# Patient Record
Sex: MALE | Race: WHITE | NOT HISPANIC OR LATINO | Employment: FULL TIME | URBAN - METROPOLITAN AREA
[De-identification: names, ages, dates, MRNs, and addresses within clinical notes are randomized per-mention and may not be internally consistent; named-entity substitution may affect disease eponyms.]

---

## 2020-02-27 ENCOUNTER — OFFICE VISIT - HEALTHEAST (OUTPATIENT)
Dept: FAMILY MEDICINE | Facility: CLINIC | Age: 40
End: 2020-02-27

## 2020-02-27 ENCOUNTER — COMMUNICATION - HEALTHEAST (OUTPATIENT)
Dept: TELEHEALTH | Facility: CLINIC | Age: 40
End: 2020-02-27

## 2020-02-27 DIAGNOSIS — R03.0 ELEVATED BLOOD PRESSURE READING WITHOUT DIAGNOSIS OF HYPERTENSION: ICD-10-CM

## 2020-02-27 DIAGNOSIS — Z00.00 ROUTINE HEALTH MAINTENANCE: ICD-10-CM

## 2020-02-27 ASSESSMENT — PATIENT HEALTH QUESTIONNAIRE - PHQ9: SUM OF ALL RESPONSES TO PHQ QUESTIONS 1-9: 4

## 2020-02-27 ASSESSMENT — MIFFLIN-ST. JEOR: SCORE: 1825.25

## 2020-02-27 ASSESSMENT — ANXIETY QUESTIONNAIRES
GAD7 TOTAL SCORE: 6
6. BECOMING EASILY ANNOYED OR IRRITABLE: NOT AT ALL
4. TROUBLE RELAXING: SEVERAL DAYS
2. NOT BEING ABLE TO STOP OR CONTROL WORRYING: SEVERAL DAYS
3. WORRYING TOO MUCH ABOUT DIFFERENT THINGS: MORE THAN HALF THE DAYS
7. FEELING AFRAID AS IF SOMETHING AWFUL MIGHT HAPPEN: NOT AT ALL
IF YOU CHECKED OFF ANY PROBLEMS ON THIS QUESTIONNAIRE, HOW DIFFICULT HAVE THESE PROBLEMS MADE IT FOR YOU TO DO YOUR WORK, TAKE CARE OF THINGS AT HOME, OR GET ALONG WITH OTHER PEOPLE: NOT DIFFICULT AT ALL
5. BEING SO RESTLESS THAT IT IS HARD TO SIT STILL: NOT AT ALL
1. FEELING NERVOUS, ANXIOUS, OR ON EDGE: MORE THAN HALF THE DAYS

## 2021-05-26 ASSESSMENT — PATIENT HEALTH QUESTIONNAIRE - PHQ9: SUM OF ALL RESPONSES TO PHQ QUESTIONS 1-9: 4

## 2021-05-28 ASSESSMENT — ANXIETY QUESTIONNAIRES: GAD7 TOTAL SCORE: 6

## 2021-06-04 VITALS
HEART RATE: 70 BPM | HEIGHT: 69 IN | SYSTOLIC BLOOD PRESSURE: 124 MMHG | OXYGEN SATURATION: 99 % | BODY MASS INDEX: 30.36 KG/M2 | DIASTOLIC BLOOD PRESSURE: 84 MMHG | WEIGHT: 205 LBS

## 2021-06-06 NOTE — PROGRESS NOTES
Assessment/Plan:     Patient presents today for routine physical examination.    Healthcare Maintenance: USPSTF recommendations for age 40;  Patient has been counseled on/screened for:  - intimate partner violence and there are no concerns at this time  - a healthful diet and physical activity for CVD disease prevention  - Diabetes and hyperlipidemia: screening was performed at work within the last 2 months, results reviewed  Sexually transmitted infections: Patient would not like to be screened for chlamydia, gonorrhea, syphilis, HIV, and hepatitis  Immunizations: up to date except for td/tdap and influenza which was recommended    Additional concerns are as detailed below:    Patient has a history of elevated blood pressures, but no formal diagnosis of hypertension.  His blood pressure today is well controlled.  I recommended exercise and monitoring.  If patient has persistently elevated blood pressures he will return to clinic.    Patient's pinky has full range of motion.  There is no decrease strength.  The pain is mild.  I recommended magda taping with a low threshold for hand therapy.    AVS printed and given to patient.  Return to clinic in 1 year.     I have had an Advance Directives discussion with the patient.    This note has been dictated using voice recognition software. Any grammatical or context distortions are unintentional and inherent to the the software.     Stephenie Gallardo MD  Family Medicine [facility]    Subjective:     Chema Be is a 39 y.o. male who presents to clinic for routine physical.    Additional concerns include:    1. Patient had elevated blood pressure at a lab appointment at work. It was elevated both times. It was elevated when he went to the dentist, as well.   2.  Patient also injured his pinky finger by ringing his hands approximate 1 year ago.  He heard a popping sensation and occasionally now will swell.  It aches, sometimes there is a shooting pain that  "radiates up his arm.  No decrease in strength and he has full range of motion.    Diet: well balanced diet and frequent junk food  Physical Activity: The patient engages in little, if any physical activity.    PHQ-9 Score:  4    Health Care Directive: not on file but handout provided    Patient Care Tea  PCP: Stephenie Gallardo     Past Medical History, Family History, and Social History reviewed.     Review of systems is as stated in HPI.  Patient endorses: joint pain, dizziness  The remainder of the 10 system review is otherwise negative.    Objective:     /84   Pulse 70   Ht 5' 9\" (1.753 m)   Wt 205 lb (93 kg)   SpO2 99%   BMI 30.27 kg/m    Gen: Alert, NAD, appears stated age, normal hygiene   Eyes: conjunctivae without injection, sclera clear, EOMI  ENT/mouth: nares clear, septum midline, absent rhinorrhea,absent pharyngeal injection, neck is supple, no thyroid enlargement  CV: RRR, no murmur appreciated, pedal edema absent bilaterally  Resp: CTAB, no wheezes, rales or ronchi  ABD: normoactive, non-tender to palpation, nondistended  MSK: grossly full range of motion in all joints especially of the pinky of the right hand, no obvious deformity  Neuro: CN II-XII grossly intact, no deficits in coordination  Psych: no apparent hallucinations or delusions, no pressured speech; alert, oriented x3  SKIN: dry and without lesions; fleshy papule located on patient's back  Heme/lymph: no pallor, no active bleeding/bruising, no adenopathy appreciated    Medications:  No current outpatient medications on file.       Allergies:  No Known Allergies    PMH:  Past Medical History:   Diagnosis Date     Anxiety      Depression      Elevated blood pressure reading      HLD (hyperlipidemia)        PSH:  History reviewed. No pertinent surgical history.    Family Hx:  Family History   Problem Relation Age of Onset     Heart disease Paternal Uncle      Diabetes Maternal Grandfather      Alcohol abuse Maternal Grandfather      " Depression Maternal Grandfather        Social History:  Social History     Socioeconomic History     Marital status: Single     Spouse name: Not on file     Number of children: Not on file     Years of education: Not on file     Highest education level: Not on file   Occupational History     Not on file   Social Needs     Financial resource strain: Not on file     Food insecurity:     Worry: Not on file     Inability: Not on file     Transportation needs:     Medical: Not on file     Non-medical: Not on file   Tobacco Use     Smoking status: Never Smoker     Smokeless tobacco: Never Used   Substance and Sexual Activity     Alcohol use: Yes     Alcohol/week: 1.0 standard drinks     Types: 1 Cans of beer per week     Drug use: Never     Sexual activity: Not Currently     Partners: Female   Lifestyle     Physical activity:     Days per week: Not on file     Minutes per session: Not on file     Stress: Not on file   Relationships     Social connections:     Talks on phone: Not on file     Gets together: Not on file     Attends [employer] service: Not on file     Active member of club or organization: Not on file     Attends meetings of clubs or organizations: Not on file     Relationship status: Not on file     Intimate partner violence:     Fear of current or ex partner: Not on file     Emotionally abused: Not on file     Physically abused: Not on file     Forced sexual activity: Not on file   Other Topics Concern     Not on file   Social History Narrative     Not on file       No results found for: LDLCALC     Immunization History   Administered Date(s) Administered     Td, Adult, Absorbed 10/20/2009     There are no preventive care reminders to display for this patient.

## 2021-06-16 PROBLEM — R03.0 ELEVATED BLOOD PRESSURE READING WITHOUT DIAGNOSIS OF HYPERTENSION: Status: ACTIVE | Noted: 2020-02-27

## 2021-08-14 ENCOUNTER — HEALTH MAINTENANCE LETTER (OUTPATIENT)
Age: 41
End: 2021-08-14

## 2021-10-04 ENCOUNTER — HEALTH MAINTENANCE LETTER (OUTPATIENT)
Age: 41
End: 2021-10-04

## 2022-01-14 ENCOUNTER — IMMUNIZATION (OUTPATIENT)
Dept: NURSING | Facility: CLINIC | Age: 42
End: 2022-01-14
Payer: COMMERCIAL

## 2022-01-14 PROCEDURE — 0054A COVID-19,PF,PFIZER (12+ YRS): CPT

## 2022-01-14 PROCEDURE — 91305 COVID-19,PF,PFIZER (12+ YRS): CPT

## 2022-02-22 ENCOUNTER — HOSPITAL ENCOUNTER (EMERGENCY)
Facility: CLINIC | Age: 42
Discharge: HOME | End: 2022-02-22
Attending: EMERGENCY MEDICINE | Admitting: EMERGENCY MEDICINE
Payer: COMMERCIAL

## 2022-02-22 VITALS
HEART RATE: 52 BPM | SYSTOLIC BLOOD PRESSURE: 124 MMHG | WEIGHT: 215 LBS | RESPIRATION RATE: 16 BRPM | OXYGEN SATURATION: 98 % | BODY MASS INDEX: 30.78 KG/M2 | DIASTOLIC BLOOD PRESSURE: 92 MMHG | TEMPERATURE: 98.6 F | HEIGHT: 70 IN

## 2022-02-22 DIAGNOSIS — H81.10 BENIGN PAROXYSMAL POSITIONAL VERTIGO, UNSPECIFIED LATERALITY: ICD-10-CM

## 2022-02-22 PROCEDURE — 250N000013 HC RX MED GY IP 250 OP 250 PS 637: Performed by: EMERGENCY MEDICINE

## 2022-02-22 PROCEDURE — 99283 EMERGENCY DEPT VISIT LOW MDM: CPT

## 2022-02-22 RX ORDER — MECLIZINE HYDROCHLORIDE 25 MG/1
25 TABLET ORAL 3 TIMES DAILY PRN
Qty: 20 TABLET | Refills: 0 | Status: SHIPPED | OUTPATIENT
Start: 2022-02-22 | End: 2022-03-03

## 2022-02-22 RX ORDER — MECLIZINE HYDROCHLORIDE 25 MG/1
25 TABLET ORAL ONCE
Status: COMPLETED | OUTPATIENT
Start: 2022-02-22 | End: 2022-02-22

## 2022-02-22 RX ADMIN — MECLIZINE HYDROCHLORIDE 25 MG: 25 TABLET ORAL at 08:27

## 2022-02-22 ASSESSMENT — ENCOUNTER SYMPTOMS
NAUSEA: 0
DIZZINESS: 1
SHORTNESS OF BREATH: 0
HEADACHES: 0

## 2022-02-22 NOTE — DISCHARGE INSTRUCTIONS
Your symptoms appear consistent with benign paroxysmal positional vertigo.  Take the meclizine as needed for any repeat episodes of vertigo/dizziness.  Follow-up with the primary care physician for reevaluation as needed or return back to ED sooner for any worsening vertigo or any other new or concerning symptoms

## 2022-02-22 NOTE — ED TRIAGE NOTES
"Reports \"room spinning\" dizziness when layed down to bed at 2130 last pm, reports dizziness has continued when woke up this am and feels \"off balance\" with ambulation  Reports tingling in bilat hands and \"heaviness\" in hands, mild nausea  No unilateral weakness noted  Provider in triage to assess, no stroke code called        "

## 2022-02-22 NOTE — ED PROVIDER NOTES
EMERGENCY DEPARTMENT ENCOUNTER      NAME: Chema Be  AGE: 41 year old male  YOB: 1980  MRN: [id number]  EVALUATION DATE & TIME: No admission date for patient encounter.    PCP: Stephenie Gallardo    ED PROVIDER: Braulio Hough DO      Chief Complaint   Patient presents with     Dizziness       FINAL IMPRESSION:  1. Benign paroxysmal positional vertigo, unspecified laterality          ED COURSE & MEDICAL DECISION MAKIN-year-old male presented to the ED for evaluation of dizziness that started since he laid down last night to sleep.  Today the dizziness worsened after he woke up from sleep.  The patient stated that the dizziness was worsened with head position changes.  He denied any other significant symptoms.  Here in the ED the patient was slightly hypertensive but he was otherwise hemodynamically stable.  Initially evaluated the patient in triage because of concerns about possible stroke.  At the time of his evaluation the patient did not appear to be in any obvious distress or discomfort.  The patient did not have any nystagmus or any focal neurologic or cerebellar deficits.  The remainder of his physical exam was unremarkable as well.  Following his initial evaluation the patient was informed that his symptoms are likely due to benign paroxysmal positional vertigo.  Patient was given a dose of oral meclizine to treat his symptoms.      The patient was reevaluated approximately 1 hour after receiving the meclizine.  The patient stated that his symptoms had improved with the medication.  He was able to ambulate around the move and move his head without any reoccurrence of the vertigo symptoms.  The patient was educated about benign paroxysmal positional vertigo and reassured.  Patient felt comfortable returning home.  He was sent home with meclizine to take as needed.  He was informed that the meclizine may make him feel slightly drowsy.  The patient was instructed to follow-up with his  "care physician for reevaluation as needed or to return back to ED sooner for any worsening vertigo or any other new or concerning symptoms.     Pertinent Labs & Imaging studies reviewed. (See chart for details)  8:10 AM I met with the patient to gather history and to perform my initial exam. We discussed plans for the ED course, including diagnostic testing and treatment.       At the conclusion of the encounter I discussed the results of all of the tests and the disposition. The questions were answered. The patient or family acknowledged understanding and was agreeable with the care plan.       PPE worn: n95 mask, goggles    MEDICATIONS GIVEN IN THE EMERGENCY:  Medications   meclizine (ANTIVERT) tablet 25 mg (25 mg Oral Given 2/22/22 0827)       NEW PRESCRIPTIONS STARTED AT TODAY'S ER VISIT  New Prescriptions    MECLIZINE (ANTIVERT) 25 MG TABLET    Take 1 tablet (25 mg) by mouth 3 times daily as needed for dizziness     =================================================================    HPI    Patient information was obtained from: patient    Use of : N/A         Chema Be is a 41 year old male with no recorded pertinent medical history who presents to this ED via walk-in for evaluation of dizziness.     The patient reports sudden onset of room spinning dizziness last night while laying in bed around 2130 and decided to go to sleep. When the patient woke up this morning, the dizziness was still present and he stumbled getting up. Patient reports feeling \"wobbly\" while walking around this morning, prompting his visit to the ED. Here in the ED, patient states that the dizziness is coming and going. He notes that his hands feel \"heavy\" and were tingly while laying in bed last night. Patient denies headache, nausea, vision changes, chest pain, shortness of breath, hearing changes, and any other symptoms or complaints at this time.       REVIEW OF SYSTEMS   Review of Systems   HENT:        Negative for " hearing changes   Eyes: Negative for visual disturbance.   Respiratory: Negative for shortness of breath.    Cardiovascular: Negative for chest pain.   Gastrointestinal: Negative for nausea.   Neurological: Positive for dizziness. Negative for headaches.        Positive for tingling (bilateral hands)   All other systems reviewed and are negative.      PAST MEDICAL HISTORY:  Past Medical History:   Diagnosis Date     Anxiety      Anxiety      Depression      Depressive disorder      Elevated blood pressure reading      HLD (hyperlipidemia)        PAST SURGICAL HISTORY:  No past surgical history on file.      CURRENT MEDICATIONS:    meclizine (ANTIVERT) 25 MG tablet  HYDROcodone-acetaminophen (NORCO) 5-325 MG per tablet      ALLERGIES:  No Known Allergies    FAMILY HISTORY:  Family History   Problem Relation Age of Onset     Heart Disease Paternal Uncle      Diabetes Maternal Grandfather      Alcoholism Maternal Grandfather      Depression Maternal Grandfather        SOCIAL HISTORY:   Social History     Socioeconomic History     Marital status: Single     Spouse name: Not on file     Number of children: Not on file     Years of education: Not on file     Highest education level: Not on file   Occupational History     Not on file   Tobacco Use     Smoking status: Never Smoker     Smokeless tobacco: Never Used   Substance and Sexual Activity     Alcohol use: Yes     Alcohol/week: 1.0 standard drink     Drug use: Never     Sexual activity: Not Currently     Partners: Female   Other Topics Concern     Not on file   Social History Narrative     Not on file     Social Determinants of Health     Financial Resource Strain: Not on file   Food Insecurity: Not on file   Transportation Needs: Not on file   Physical Activity: Not on file   Stress: Not on file   Social Connections: Not on file   Intimate Partner Violence: Not on file   Housing Stability: Not on file       VITALS:  /84   Pulse 61   Temp 98.6  F (37  C)  "(Oral)   Resp 16   Ht 1.778 m (5' 10\")   Wt 97.5 kg (215 lb)   SpO2 97%   BMI 30.85 kg/m      PHYSICAL EXAM    General presentation: Alert, Vital signs reviewed. NAD  HENT: ENT inspection is normal. Oropharynx is moist and clear.   Eye: Pupils are equal and reactive to light. EOMI. No nystagmus.   Neck: The neck is supple, with full ROM, with no evidence of meningismus.  Pulmonary: Currently in no acute respiratory distress. Normal, non labored respirations, the lung sounds are normal with good equal air movement. Clear to auscultation bilaterally.   Circulatory: Regular rate and rhythm. Peripheral pulses are strong and equal. No murmurs, rubs, or gallops.   Abdominal: The abdomen is soft. Nontender. No rigidity, guarding, or rebound. Bowel sounds normal.   Neurologic: Alert, oriented to person, place, and time. No motor deficit. No sensory deficit. Cranial nerves II through XII are intact. Finger-nose-finger test normal bilaterally.   Musculoskeletal: No extremity tenderness. Full range of motion in all extremities. No extremity edema.   Skin: Skin color is normal. No rash. Warm. Dry to touch.      National Institutes of Health Stroke Scale  Exam Interval: Baseline   Score    Level of consciousness: (0)   Alert, keenly responsive    LOC questions: (0)   Answers both questions correctly    LOC commands: (0)   Performs both tasks correctly    Best gaze: (0)   Normal    Visual: (0)   No visual loss    Facial palsy: (0)   Normal symmetrical movements    Motor arm (left): (0)   No drift    Motor arm (right): (0)   No drift    Motor leg (left): (0)   No drift    Motor leg (right): (0)   No drift    Limb ataxia: (0)   Absent    Sensory: (0)   Normal- no sensory loss    Best language: (0)   Normal- no aphasia    Dysarthria: (0)   Normal    Extinction and inattention: (0)   No abnormality        Total Score:  0         Laney BURRIS , am serving as a scribe to document services personally performed by Braulio Hough DO " based on my observation and the provider's statements to me. I, Braulio Hough, attest that Laney Jarvis is acting in a scribe capacity, has observed my performance of the services and has documented them in accordance with my direction.    Braulio Hough DO  Emergency Medicine  [facility] EMERGENCY ROOM  [id number] [place] [address]  [phone]      Braulio Hough DO  02/22/22 1000

## 2022-03-03 ENCOUNTER — OFFICE VISIT (OUTPATIENT)
Dept: FAMILY MEDICINE | Facility: CLINIC | Age: 42
End: 2022-03-03
Payer: COMMERCIAL

## 2022-03-03 VITALS
OXYGEN SATURATION: 99 % | WEIGHT: 217 LBS | SYSTOLIC BLOOD PRESSURE: 122 MMHG | BODY MASS INDEX: 31.07 KG/M2 | DIASTOLIC BLOOD PRESSURE: 92 MMHG | HEART RATE: 76 BPM | HEIGHT: 70 IN

## 2022-03-03 DIAGNOSIS — Z00.00 HEALTHCARE MAINTENANCE: Primary | ICD-10-CM

## 2022-03-03 DIAGNOSIS — Z11.59 ENCOUNTER FOR HEPATITIS C VIRUS SCREENING TEST FOR HIGH RISK PATIENT: ICD-10-CM

## 2022-03-03 DIAGNOSIS — Z11.4 SCREENING FOR HIV WITHOUT PRESENCE OF RISK FACTORS: ICD-10-CM

## 2022-03-03 DIAGNOSIS — Z91.89 ENCOUNTER FOR HEPATITIS C VIRUS SCREENING TEST FOR HIGH RISK PATIENT: ICD-10-CM

## 2022-03-03 LAB
ALBUMIN SERPL-MCNC: 4.4 G/DL (ref 3.5–5)
ALP SERPL-CCNC: 75 U/L (ref 45–120)
ALT SERPL W P-5'-P-CCNC: 13 U/L (ref 0–45)
ANION GAP SERPL CALCULATED.3IONS-SCNC: 13 MMOL/L (ref 5–18)
AST SERPL W P-5'-P-CCNC: 13 U/L (ref 0–40)
BILIRUB SERPL-MCNC: 0.6 MG/DL (ref 0–1)
BUN SERPL-MCNC: 19 MG/DL (ref 8–22)
CALCIUM SERPL-MCNC: 9.8 MG/DL (ref 8.5–10.5)
CHLORIDE BLD-SCNC: 104 MMOL/L (ref 98–107)
CHOLEST SERPL-MCNC: 217 MG/DL
CO2 SERPL-SCNC: 24 MMOL/L (ref 22–31)
CREAT SERPL-MCNC: 1.09 MG/DL (ref 0.7–1.3)
FASTING STATUS PATIENT QL REPORTED: YES
GFR SERPL CREATININE-BSD FRML MDRD: 87 ML/MIN/1.73M2
GLUCOSE BLD-MCNC: 99 MG/DL (ref 70–125)
HDLC SERPL-MCNC: 41 MG/DL
HIV 1+2 AB+HIV1 P24 AG SERPL QL IA: NEGATIVE
LDLC SERPL CALC-MCNC: 139 MG/DL
POTASSIUM BLD-SCNC: 4.8 MMOL/L (ref 3.5–5)
PROT SERPL-MCNC: 7.5 G/DL (ref 6–8)
SODIUM SERPL-SCNC: 141 MMOL/L (ref 136–145)
TRIGL SERPL-MCNC: 187 MG/DL

## 2022-03-03 PROCEDURE — 36415 COLL VENOUS BLD VENIPUNCTURE: CPT | Performed by: FAMILY MEDICINE

## 2022-03-03 PROCEDURE — 80061 LIPID PANEL: CPT | Performed by: FAMILY MEDICINE

## 2022-03-03 PROCEDURE — 86803 HEPATITIS C AB TEST: CPT | Performed by: FAMILY MEDICINE

## 2022-03-03 PROCEDURE — 87389 HIV-1 AG W/HIV-1&-2 AB AG IA: CPT | Performed by: FAMILY MEDICINE

## 2022-03-03 PROCEDURE — 80053 COMPREHEN METABOLIC PANEL: CPT | Performed by: FAMILY MEDICINE

## 2022-03-03 PROCEDURE — 99396 PREV VISIT EST AGE 40-64: CPT | Performed by: FAMILY MEDICINE

## 2022-03-03 NOTE — PROGRESS NOTES
Assessment/Plan:     Patient presents today for routine physical examination.    Healthcare Maintenance: USPSTF recommendations for age 41;  Patient has been counseled on/screened for:  - intimate partner violence and there are no concerns at this time  - a healthful diet and physical activity for CVD prevention  - Diabetes and hyperlipidemia: screening was performed.   Sexually transmitted infections: Patient would not like to be screened for chlamydia, gonorrhea, syphilis, HIV, and hepatitis  Immunizations: up to date except for influenza which was recommended        Additional concerns are as detailed below:  Blood pressure is again mildly elevated.  Would recommend patient monitor blood pressure at home with a sphygmomanometer and report back blood pressure seem to be climbing.    1. Healthcare maintenance  - Comprehensive metabolic panel (BMP + Alb, Alk Phos, ALT, AST, Total. Bili, TP); Future  - Lipid panel reflex to direct LDL Fasting; Future    2. Encounter for hepatitis C virus screening test for high risk patient  - Hepatitis C antibody; Future    3. Screening for HIV without presence of risk factors  - HIV Antigen Antibody Combo; Future      AVS printed and given to patient.  Return to clinic in 1 year.     I have had an Advance Directives discussion with the patient.    This note has been dictated using voice recognition software. Any grammatical or context distortions are unintentional and inherent to the the software.     Stephenie Gallardo MD  Family Medicine [facility]    Subjective:     Chema Be is a 41 year old male who presents to clinic for routine physical.    Additional concerns include:    1. Vertigo brought pt to the ED last week.     PHQ-2 Score:  1    Health Care Directive: discussed    Patient Care Team:   PCP: Stephenie Gallardo     Past Medical History, Family History, and Social History reviewed.     Review of systems:  Patient endorses: depression/anxiety symptoms  The  "remainder of the 10 system review is otherwise negative.    Objective:     BP (!) 122/92   Pulse 76   Ht 1.765 m (5' 9.5\")   Wt 98.4 kg (217 lb)   SpO2 99%   BMI 31.59 kg/m    Gen: Alert, NAD, appears stated age, normal hygiene   Eyes: conjunctivae without injection, sclera clear, EOMI  ENT/mouth: nares clear, septum midline, absent rhinorrhea,absent pharyngeal injection, neck is supple, no thyroid enlargement  CV: RRR, no murmur appreciated, pedal edema absent bilaterally  Resp: CTAB, no wheezes, rales or ronchi  ABD: normoactive, non-tender to palpation, nondistended  MSK: grossly full range of motion in all joints, no obvious deformity  Neuro: CN II-XII grossly intact, no deficits in coordination  Psych: no apparent hallucinations or delusions, no pressured speech; alert, oriented x3  SKIN: dry and without lesions  Heme/lymph: no pallor, no active bleeding/bruising, no adenopathy appreciated    Medications:  No current outpatient medications on file.     No current facility-administered medications for this visit.       Allergies:  No Known Allergies    PMH:  Past Medical History:   Diagnosis Date     Anxiety      Anxiety      Depression      Depressive disorder      Elevated blood pressure reading      HLD (hyperlipidemia)        PSH:  History reviewed. No pertinent surgical history.    Family Hx:  Family History   Problem Relation Age of Onset     Heart Disease Paternal Uncle      Diabetes Maternal Grandfather      Alcoholism Maternal Grandfather      Depression Maternal Grandfather        Social History:  Social History     Socioeconomic History     Marital status: Single     Spouse name: Not on file     Number of children: Not on file     Years of education: Not on file     Highest education level: Not on file   Occupational History     Not on file   Tobacco Use     Smoking status: Never Smoker     Smokeless tobacco: Never Used   Substance and Sexual Activity     Alcohol use: Yes     Alcohol/week: 1.0 " standard drink     Drug use: Never     Sexual activity: Not Currently     Partners: Female   Other Topics Concern     Not on file   Social History Narrative     Not on file     Social Determinants of Health     Financial Resource Strain: Not on file   Food Insecurity: Not on file   Transportation Needs: Not on file   Physical Activity: Not on file   Stress: Not on file   Social Connections: Not on file   Intimate Partner Violence: Not on file   Housing Stability: Not on file       No components found for: LDLCALC     Immunization History   Administered Date(s) Administered     COVID-19,PF,Ray 04/07/2021     COVID-19,PF,Pfizer (12+ Yrs) 01/14/2022     Td (Adult), Adsorbed 10/20/2009     Tdap (Adacel,Boostrix) 02/27/2020         Answers for HPI/ROS submitted by the patient on 2/28/2022  Frequency of exercise:: None  Getting at least 3 servings of Calcium per day:: NO  Diet:: Regular (no restrictions)  Taking medications regularly:: Yes  Medication side effects:: Not applicable, None  Bi-annual eye exam:: NO  Dental care twice a year:: Yes  Sleep apnea or symptoms of sleep apnea:: None  Additional concerns today:: Yes

## 2022-03-04 LAB — HCV AB SERPL QL IA: NEGATIVE

## 2022-09-11 ENCOUNTER — HEALTH MAINTENANCE LETTER (OUTPATIENT)
Age: 42
End: 2022-09-11

## 2023-03-03 ASSESSMENT — ENCOUNTER SYMPTOMS
PALPITATIONS: 0
COUGH: 0
DIARRHEA: 0
DIZZINESS: 0
MYALGIAS: 0
PARESTHESIAS: 0
EYE PAIN: 0
DYSURIA: 0
NAUSEA: 0
SORE THROAT: 1
FREQUENCY: 0
ABDOMINAL PAIN: 0
HEMATOCHEZIA: 0
WEAKNESS: 0
ARTHRALGIAS: 0
HEADACHES: 0
FEVER: 0
NERVOUS/ANXIOUS: 1
HEARTBURN: 0
CHILLS: 0
SHORTNESS OF BREATH: 0
JOINT SWELLING: 0
HEMATURIA: 0
CONSTIPATION: 0

## 2023-03-10 ENCOUNTER — OFFICE VISIT (OUTPATIENT)
Dept: FAMILY MEDICINE | Facility: CLINIC | Age: 43
End: 2023-03-10
Payer: COMMERCIAL

## 2023-03-10 VITALS
HEART RATE: 77 BPM | OXYGEN SATURATION: 99 % | SYSTOLIC BLOOD PRESSURE: 126 MMHG | HEIGHT: 69 IN | WEIGHT: 221.5 LBS | TEMPERATURE: 98.3 F | BODY MASS INDEX: 32.81 KG/M2 | DIASTOLIC BLOOD PRESSURE: 84 MMHG | RESPIRATION RATE: 14 BRPM

## 2023-03-10 DIAGNOSIS — E66.09 CLASS 1 OBESITY DUE TO EXCESS CALORIES WITHOUT SERIOUS COMORBIDITY WITH BODY MASS INDEX (BMI) OF 32.0 TO 32.9 IN ADULT: ICD-10-CM

## 2023-03-10 DIAGNOSIS — R03.0 ELEVATED BLOOD PRESSURE READING WITHOUT DIAGNOSIS OF HYPERTENSION: ICD-10-CM

## 2023-03-10 DIAGNOSIS — H02.60 XANTHELASMA: ICD-10-CM

## 2023-03-10 DIAGNOSIS — E66.811 CLASS 1 OBESITY DUE TO EXCESS CALORIES WITHOUT SERIOUS COMORBIDITY WITH BODY MASS INDEX (BMI) OF 32.0 TO 32.9 IN ADULT: ICD-10-CM

## 2023-03-10 DIAGNOSIS — Z00.00 ROUTINE PHYSICAL EXAMINATION: Primary | ICD-10-CM

## 2023-03-10 DIAGNOSIS — E78.5 HYPERLIPIDEMIA LDL GOAL <100: ICD-10-CM

## 2023-03-10 LAB
ANION GAP SERPL CALCULATED.3IONS-SCNC: 13 MMOL/L (ref 7–15)
BUN SERPL-MCNC: 17.1 MG/DL (ref 6–20)
CALCIUM SERPL-MCNC: 9.9 MG/DL (ref 8.6–10)
CHLORIDE SERPL-SCNC: 100 MMOL/L (ref 98–107)
CHOLEST SERPL-MCNC: 247 MG/DL
CREAT SERPL-MCNC: 1.19 MG/DL (ref 0.67–1.17)
DEPRECATED HCO3 PLAS-SCNC: 25 MMOL/L (ref 22–29)
ERYTHROCYTE [DISTWIDTH] IN BLOOD BY AUTOMATED COUNT: 12.2 % (ref 10–15)
GFR SERPL CREATININE-BSD FRML MDRD: 78 ML/MIN/1.73M2
GLUCOSE SERPL-MCNC: 91 MG/DL (ref 70–99)
HCT VFR BLD AUTO: 44.8 % (ref 40–53)
HDLC SERPL-MCNC: 44 MG/DL
HGB BLD-MCNC: 15.5 G/DL (ref 13.3–17.7)
LDLC SERPL CALC-MCNC: 168 MG/DL
MCH RBC QN AUTO: 29.8 PG (ref 26.5–33)
MCHC RBC AUTO-ENTMCNC: 34.6 G/DL (ref 31.5–36.5)
MCV RBC AUTO: 86 FL (ref 78–100)
NONHDLC SERPL-MCNC: 203 MG/DL
PLATELET # BLD AUTO: 209 10E3/UL (ref 150–450)
POTASSIUM SERPL-SCNC: 4.1 MMOL/L (ref 3.4–5.3)
RBC # BLD AUTO: 5.21 10E6/UL (ref 4.4–5.9)
SODIUM SERPL-SCNC: 138 MMOL/L (ref 136–145)
TRIGL SERPL-MCNC: 173 MG/DL
TSH SERPL DL<=0.005 MIU/L-ACNC: 2.59 UIU/ML (ref 0.3–4.2)
WBC # BLD AUTO: 7.1 10E3/UL (ref 4–11)

## 2023-03-10 PROCEDURE — 85027 COMPLETE CBC AUTOMATED: CPT | Performed by: FAMILY MEDICINE

## 2023-03-10 PROCEDURE — 80061 LIPID PANEL: CPT | Performed by: FAMILY MEDICINE

## 2023-03-10 PROCEDURE — 99396 PREV VISIT EST AGE 40-64: CPT | Performed by: FAMILY MEDICINE

## 2023-03-10 PROCEDURE — 84443 ASSAY THYROID STIM HORMONE: CPT | Performed by: FAMILY MEDICINE

## 2023-03-10 PROCEDURE — 80048 BASIC METABOLIC PNL TOTAL CA: CPT | Performed by: FAMILY MEDICINE

## 2023-03-10 PROCEDURE — 36415 COLL VENOUS BLD VENIPUNCTURE: CPT | Performed by: FAMILY MEDICINE

## 2023-03-10 ASSESSMENT — PAIN SCALES - GENERAL: PAINLEVEL: SEVERE PAIN (7)

## 2023-04-13 ENCOUNTER — OFFICE VISIT (OUTPATIENT)
Dept: FAMILY MEDICINE | Facility: CLINIC | Age: 43
End: 2023-04-13
Payer: COMMERCIAL

## 2023-04-13 ENCOUNTER — MYC MEDICAL ADVICE (OUTPATIENT)
Dept: FAMILY MEDICINE | Facility: CLINIC | Age: 43
End: 2023-04-13

## 2023-04-13 ENCOUNTER — ANCILLARY PROCEDURE (OUTPATIENT)
Dept: GENERAL RADIOLOGY | Facility: CLINIC | Age: 43
End: 2023-04-13
Attending: FAMILY MEDICINE
Payer: COMMERCIAL

## 2023-04-13 VITALS
HEIGHT: 70 IN | BODY MASS INDEX: 32.1 KG/M2 | HEART RATE: 70 BPM | SYSTOLIC BLOOD PRESSURE: 128 MMHG | OXYGEN SATURATION: 97 % | TEMPERATURE: 97.6 F | DIASTOLIC BLOOD PRESSURE: 84 MMHG | RESPIRATION RATE: 15 BRPM | WEIGHT: 224.2 LBS

## 2023-04-13 DIAGNOSIS — J20.9 ACUTE BRONCHITIS, UNSPECIFIED ORGANISM: Primary | ICD-10-CM

## 2023-04-13 DIAGNOSIS — R05.1 ACUTE COUGH: ICD-10-CM

## 2023-04-13 DIAGNOSIS — J18.9 COMMUNITY ACQUIRED PNEUMONIA OF LEFT LOWER LOBE OF LUNG: ICD-10-CM

## 2023-04-13 DIAGNOSIS — J18.9 COMMUNITY ACQUIRED PNEUMONIA OF LEFT LOWER LOBE OF LUNG: Primary | ICD-10-CM

## 2023-04-13 PROCEDURE — 99214 OFFICE O/P EST MOD 30 MIN: CPT | Performed by: FAMILY MEDICINE

## 2023-04-13 PROCEDURE — 71046 X-RAY EXAM CHEST 2 VIEWS: CPT | Mod: TC | Performed by: RADIOLOGY

## 2023-04-13 RX ORDER — ALBUTEROL SULFATE 90 UG/1
2 AEROSOL, METERED RESPIRATORY (INHALATION) EVERY 6 HOURS PRN
Qty: 18 G | Refills: 0 | Status: SHIPPED | OUTPATIENT
Start: 2023-04-13 | End: 2024-02-14

## 2023-04-13 RX ORDER — AZITHROMYCIN 250 MG/1
TABLET, FILM COATED ORAL
Qty: 6 TABLET | Refills: 0 | Status: SHIPPED | OUTPATIENT
Start: 2023-04-13 | End: 2023-04-18

## 2023-04-13 RX ORDER — BENZONATATE 100 MG/1
100 CAPSULE ORAL 3 TIMES DAILY PRN
Qty: 30 CAPSULE | Refills: 0 | Status: SHIPPED | OUTPATIENT
Start: 2023-04-13 | End: 2024-02-14

## 2023-04-13 RX ORDER — PREDNISONE 20 MG/1
40 TABLET ORAL DAILY
Qty: 10 TABLET | Refills: 0 | Status: SHIPPED | OUTPATIENT
Start: 2023-04-13 | End: 2023-04-18

## 2023-04-13 ASSESSMENT — PAIN SCALES - GENERAL: PAINLEVEL: NO PAIN (0)

## 2023-04-13 NOTE — TELEPHONE ENCOUNTER
Writer replied to patient via MyChart and sent clinical references to patient as well.    VERONICA Bowman, RN  [employer] [facility]

## 2023-04-13 NOTE — TELEPHONE ENCOUNTER
Dr Terrell,  Please see MyChart message from patient needing provider direction regarding his MyC question?     Thank you,  MIGDALIA VenturaN, RN  [facility]

## 2023-04-13 NOTE — PROGRESS NOTES
"  Assessment & Plan     Community acquired pneumonia    - azithromycin (ZITHROMAX) 250 MG tablet  Dispense: 6 tablet; Refill: 0  - predniSONE (DELTASONE) 20 MG tablet  Dispense: 10 tablet; Refill: 0    Acute cough    - XR Chest 2 Views  - predniSONE (DELTASONE) 20 MG tablet  Dispense: 10 tablet; Refill: 0  - benzonatate (TESSALON) 100 MG capsule  Dispense: 30 capsule; Refill: 0  - albuterol (PROAIR HFA/PROVENTIL HFA/VENTOLIN HFA) 108 (90 Base) MCG/ACT inhaler  Dispense: 18 g; Refill: 0    Chest x-ray performed and personally reviewed.  Radiologist noted presence of airspace consolidation suggestive of pneumonia and left lower lobe.  Will treat with azithromycin. Counseled patient on use of medication.  We will also treat with short course of prednisone.  Provided prescription for benzonatate capsules and albuterol inhaler to use as well.  Patient counseled on these medications.  Encouraged rest, increase fluids.  Follow-up if symptoms worsen or do not improve or if new concerns develop               BMI:   Estimated body mass index is 31.95 kg/m  as calculated from the following:    Height as of this encounter: 1.784 m (5' 10.24\").    Weight as of this encounter: 101.7 kg (224 lb 3.2 oz).           Nida Terrell MD  [facility]    Sahil Mar is a 42 year old, presenting for the following health issues:  Cough (Cough for 3 weeks)        4/13/2023     9:09 AM   Additional Questions   Roomed by Marion Trivedi   Accompanied by Self     HPI   He comes in today for evaluation of the cough.  Cough has been present for the past 3 to 4 weeks.  Initially had some body aches the first couple of days he was sick.  Cough has mostly been dry.  Over the last week it has been occasionally productive.  Feels that there is phlegm and congestion in his chest.  Hears crackling in his chest, especially at night.  Has not noticed any wheezing.  Has not had shortness of breath but does feel a pressure in his " "chest.  He is otherwise feeling well.  Has not had fevers.  Has not had sore throat, sinus pain or pressure.  No postnasal drainage.  He does not have acid reflux.  He is not a smoker.  No history of asthma.  He has tried over-the-counter Mucinex which has not been helpful.  Does get a slight headache when he coughs a lot and ibuprofen is helpful for that symptom.  Review of systems is otherwise negative.  He takes no regular medications and has no known allergies.  No other concerns today          Review of Systems         Objective    /84   Pulse 70   Temp 97.6  F (36.4  C) (Temporal)   Resp 15   Ht 1.784 m (5' 10.24\")   Wt 101.7 kg (224 lb 3.2 oz)   SpO2 97%   BMI 31.95 kg/m    Body mass index is 31.95 kg/m .  Physical Exam   GENERAL: healthy, alert and no distress  EYES: Eyes grossly normal to inspection, PERRL and conjunctivae and sclerae normal  HENT: ear canals and TM's normal, nose and mouth without ulcers or lesions  RESP: coughs frequently with taking deep breaths, crackles and wheezing heard in left lower posterior lung field  CV: regular rate and rhythm, normal S1 S2, no S3 or S4, no murmur, click or rub, no peripheral edema and peripheral pulses strong  MS: no gross musculoskeletal defects noted, no edema  PSYCH: mentation appears normal, affect normal/bright    CXR - Reviewed and interpreted by me                "

## 2023-04-19 RX ORDER — AMOXICILLIN 500 MG/1
1000 CAPSULE ORAL 3 TIMES DAILY
Qty: 60 CAPSULE | Refills: 0 | Status: SHIPPED | OUTPATIENT
Start: 2023-04-19 | End: 2023-04-29

## 2023-04-19 RX ORDER — PREDNISONE 20 MG/1
20 TABLET ORAL DAILY
Qty: 5 TABLET | Refills: 0 | Status: SHIPPED | OUTPATIENT
Start: 2023-04-19 | End: 2023-04-24

## 2023-04-19 NOTE — TELEPHONE ENCOUNTER
Dr Terrell,  Please see MyChart message from patient needing provider direction.  Please respond directly to patient if appropriate.    MIGDALIA VenturaN, RN  [facility]

## 2023-04-20 ENCOUNTER — TRANSFERRED RECORDS (OUTPATIENT)
Dept: HEALTH INFORMATION MANAGEMENT | Facility: CLINIC | Age: 43
End: 2023-04-20
Payer: COMMERCIAL

## 2023-04-27 ENCOUNTER — MYC MEDICAL ADVICE (OUTPATIENT)
Dept: FAMILY MEDICINE | Facility: CLINIC | Age: 43
End: 2023-04-27

## 2023-04-27 ENCOUNTER — NURSE TRIAGE (OUTPATIENT)
Dept: FAMILY MEDICINE | Facility: CLINIC | Age: 43
End: 2023-04-27

## 2023-04-27 ENCOUNTER — OFFICE VISIT (OUTPATIENT)
Dept: FAMILY MEDICINE | Facility: CLINIC | Age: 43
End: 2023-04-27
Payer: COMMERCIAL

## 2023-04-27 ENCOUNTER — ANCILLARY PROCEDURE (OUTPATIENT)
Dept: GENERAL RADIOLOGY | Facility: CLINIC | Age: 43
End: 2023-04-27
Attending: FAMILY MEDICINE
Payer: COMMERCIAL

## 2023-04-27 VITALS
BODY MASS INDEX: 33.06 KG/M2 | HEIGHT: 69 IN | WEIGHT: 223.2 LBS | DIASTOLIC BLOOD PRESSURE: 84 MMHG | TEMPERATURE: 98.5 F | SYSTOLIC BLOOD PRESSURE: 124 MMHG | RESPIRATION RATE: 12 BRPM | OXYGEN SATURATION: 99 % | HEART RATE: 63 BPM

## 2023-04-27 DIAGNOSIS — J18.9 COMMUNITY ACQUIRED PNEUMONIA OF LEFT LOWER LOBE OF LUNG: ICD-10-CM

## 2023-04-27 DIAGNOSIS — J18.9 COMMUNITY ACQUIRED PNEUMONIA OF LEFT LOWER LOBE OF LUNG: Primary | ICD-10-CM

## 2023-04-27 DIAGNOSIS — R07.9 CHEST PAIN, UNSPECIFIED TYPE: ICD-10-CM

## 2023-04-27 LAB
ATRIAL RATE - MUSE: 63 BPM
DIASTOLIC BLOOD PRESSURE - MUSE: NORMAL MMHG
INTERPRETATION ECG - MUSE: NORMAL
P AXIS - MUSE: 35 DEGREES
PR INTERVAL - MUSE: 154 MS
QRS DURATION - MUSE: 90 MS
QT - MUSE: 390 MS
QTC - MUSE: 399 MS
R AXIS - MUSE: 16 DEGREES
SYSTOLIC BLOOD PRESSURE - MUSE: NORMAL MMHG
T AXIS - MUSE: 37 DEGREES
VENTRICULAR RATE- MUSE: 63 BPM

## 2023-04-27 PROCEDURE — 71046 X-RAY EXAM CHEST 2 VIEWS: CPT | Mod: TC | Performed by: RADIOLOGY

## 2023-04-27 PROCEDURE — 93005 ELECTROCARDIOGRAM TRACING: CPT | Performed by: FAMILY MEDICINE

## 2023-04-27 PROCEDURE — 99213 OFFICE O/P EST LOW 20 MIN: CPT | Performed by: FAMILY MEDICINE

## 2023-04-27 PROCEDURE — 93010 ELECTROCARDIOGRAM REPORT: CPT | Performed by: INTERNAL MEDICINE

## 2023-04-27 RX ORDER — CLOBETASOL PROPIONATE 0.5 MG/G
CREAM TOPICAL
COMMUNITY
Start: 2023-04-21 | End: 2024-02-14

## 2023-04-27 ASSESSMENT — PAIN SCALES - GENERAL: PAINLEVEL: SEVERE PAIN (6)

## 2023-04-27 NOTE — TELEPHONE ENCOUNTER
Care Advice:  Home Care.    An in-person office visit scheduled for this morning.  Patient would like more testing if applicable to ensure pneumonia not worsening - still coughing up greenish phlegm despite 2 more days of tx remaninig.   Patient reported to have taking OTC cough syrum/dropps.   Client denied fever, dizziness, coughing up blood    VERONICA De La Paz, RN  [facility]      Reason for Disposition    Chest pain(s) lasting a few seconds from coughing    Additional Information    Negative: SEVERE difficulty breathing (e.g., struggling for each breath, speaks in single words)    Negative: Passed out (i.e., fainted, collapsed and was not responding)    Negative: Difficult to awaken or acting confused (e.g., disoriented, slurred speech)    Negative: Shock suspected (e.g., cold/pale/clammy skin, too weak to stand, low BP, rapid pulse)    Negative: Chest pain lasting longer than 5 minutes and ANY of the following:* Over 44 years old* Over 30 years old and at least one cardiac risk factor (e.g., diabetes mellitus, high blood pressure, high cholesterol, smoker, or strong family history of heart disease)* History of heart disease (i.e., angina, heart attack, heart failure, bypass surgery, takes nitroglycerin)* Pain is crushing, pressure-like, or heavy    Negative: Heart beating < 50 beats per minute OR > 140 beats per minute    Negative: Visible sweat on face or sweat dripping down face    Negative: Sounds like a life-threatening emergency to the triager    Negative: Followed an injury to chest    Negative: SEVERE chest pain    Negative: Pain also in shoulder(s) or arm(s) or jaw    Negative: Difficulty breathing    Negative: Cocaine use within last 3 days    Negative: Major surgery in the past month    Negative: Hip or leg fracture (broken bone) in past month (or had cast on leg or ankle in past month)    Negative: Illness requiring prolonged bedrest in past month (e.g., immobilization, long  "hospital stay)    Negative: Long-distance travel in past month (e.g., car, bus, train, plane; with trip lasting 6 or more hours)    Negative: History of prior 'blood clot' in leg or lungs (i.e., deep vein thrombosis, pulmonary embolism)    Negative: History of inherited increased risk of blood clots (e.g., Factor 5 Leiden, Anti-thrombin 3, Protein C or Protein S deficiency, Prothrombin mutation)    Negative: Cancer treatment in the past two months (or has cancer now)    Negative: Heart beating irregularly or very rapidly    Negative: Chest pain lasting longer than 5 minutes and occurred in last 3 days (72 hours) (Exception: feels exactly the same as previously diagnosed heartburn and has accompanying sour taste in mouth)    Negative: Chest pain or 'angina' comes and goes and is happening more often (increasing in frequency) or getting worse (increasing in severity) (Exception: chest pains that last only a few seconds)    Negative: Dizziness or lightheadedness    Negative: Coughing up blood    Negative: Patient sounds very sick or weak to the triager    Negative: Patient says chest pain feels exactly the same as previously diagnosed 'heartburn' and describes burning in chest and accompanying sour taste in mouth    Negative: Fever > 100.4 F (38.0 C)    Negative: Chest pain(s) lasting a few seconds persists > 3 days    Negative: Rash in same area as pain (may be described as 'small blisters')    Negative: All other patients with chest pain (Exception: fleeting chest pain lasting a few seconds)    Negative: Patient wants to be seen    Answer Assessment - Initial Assessment Questions  1. LOCATION: \"Where does it hurt?\"        Left side of chest  2. RADIATION: \"Does the pain go anywhere else?\" (e.g., into neck, jaw, arms, back)      No, just in the chest, above from left sternum to end of rib cage .  Chest pain relief with Ibuprofen  3. ONSET: \"When did the chest pain begin?\" (Minutes, hours or days)       A couple of " "days ago after struggling with coughing for the last 30+ days.  Was diagnosed with pneumonia.   4. PATTERN \"Does the pain come and go, or has it been constant since it started?\"  \"Does it get worse with exertion?\"       Consistent.  Relief with Ibuprofen. Once medication wears out, pain resumes.  5. DURATION: \"How long does it last\" (e.g., seconds, minutes, hours)      When feels the pain, it's always there. Can feel the pain with movement, turning or coughing.   6. SEVERITY: \"How bad is the pain?\"  (e.g., Scale 1-10; mild, moderate, or severe)     - MILD (1-3): doesn't interfere with normal activities      - MODERATE (4-7): interferes with normal activities or awakens from sleep     - SEVERE (8-10): excruciating pain, unable to do any normal activities        With the Ibuprofen in place, pain is at 1-3.  Without Ibuprofen, it can be in the moderate range.   7. CARDIAC RISK FACTORS: \"Do you have any history of heart problems or risk factors for heart disease?\" (e.g., angina, prior heart attack; diabetes, high blood pressure, high cholesterol, smoker, or strong family history of heart disease)      Not really any of the above pertains to health history. Mild elevated cholesterol level and blood pressure.   8. PULMONARY RISK FACTORS: \"Do you have any history of lung disease?\"  (e.g., blood clots in lung, asthma, emphysema, birth control pills)      None of the above.   9. CAUSE: \"What do you think is causing the chest pain?\"      From coughing too much for over a month.  Denied cardiac related symptoms in the past.    10. OTHER SYMPTOMS: \"Do you have any other symptoms?\" (e.g., dizziness, nausea, vomiting, sweating, fever, difficulty breathing, cough)        Coughing (dry) at times can produce some phlegm(milky greenish-this morning yet).  11. PREGNANCY: \"Is there any chance you are pregnant?\" \"When was your last menstrual period?\"        n/a    Protocols used: CHEST PAIN-A-OH      "

## 2023-04-28 NOTE — PROGRESS NOTES
"  Assessment & Plan     Community acquired pneumonia of left lower lobe of lung  X-ray showing stability to mild improvement of the left lower lobe infiltrate.  Clinically he is improving. X-ray without evidence of complication.  May  continue monitoring his symptoms.  Low threshold for transition to fluoroquinolone if concern for worsening or failure to improve.  Discussed importance of x-ray to ensure complete resolution.  - XR Chest 2 Views; Future    Chest pain, unspecified type  EKG unremarkable, chest x-ray without pneumothorax, abscess, overt rib fracture.  Most likely costochondritis or other pleuritic chest wall pain.  May treat symptomatically.  - EKG 12-lead, tracing only  - XR Chest 2 Views; Future             BMI:   Estimated body mass index is 33.17 kg/m  as calculated from the following:    Height as of this encounter: 1.747 m (5' 8.78\").    Weight as of this encounter: 101.2 kg (223 lb 3.2 oz).           Kay Nowak MD  [facility] HILARIO Mar is a 42 year old, presenting for the following health issues:  Chronic Cough (Chest pain - in the last day or 2 )        4/27/2023     9:41 AM   Additional Questions   Roomed by Aminata     Here for follow-up of community-acquired pneumonia and to discuss new chest pain.  Seen in clinic on April 13 and treated with azithromycin and prednisone due to significant cough and left lower lobe pneumonia on x-ray.  As he was not responding, he was started on amoxicillin on April 19.  The next day he felt more ill for about a day but since then has begun to feel better.  His cough is much less frequent, less productive.  No fevers or chills.  For the past 2 days he has had some discomfort in the left upper chest feeling tender and bruised and worse with coughing or moving.  Improves with rest.  Ibuprofen helps.  Sometimes awakens him from sleep especially with movement.  Denies any GI symptoms or shortness of breath.  No " "palpitations.    History of Present Illness       Reason for visit:  Cough    He eats 2-3 servings of fruits and vegetables daily.He consumes 0 sweetened beverage(s) daily.He exercises with enough effort to increase his heart rate 9 or less minutes per day.  He exercises with enough effort to increase his heart rate 3 or less days per week.   He is taking medications regularly.               Review of Systems         Objective    /84 (BP Location: Left arm, Patient Position: Sitting, Cuff Size: Adult Large)   Pulse 63   Temp 98.5  F (36.9  C) (Oral)   Resp 12   Ht 1.747 m (5' 8.78\")   Wt 101.2 kg (223 lb 3.2 oz)   SpO2 99%   BMI 33.17 kg/m    Body mass index is 33.17 kg/m .  Physical Exam   Alert and pleasant.  Mucous membranes moist.  Neck supple without adenopathy.  Heart with regular rate and rhythm.  Lungs with faint expiratory rhonchi in the left lower lung field.  Good aeration throughout.  No wheezes.  Extremities without edema.  Mild but poorly localized tenderness to palpation left upper chest, no crepitus.    I ordered and personally reviewed twelve-lead EKG which reveals normal sinus rhythm, no ischemic or arrhythmic changes.    I ordered and personally reviewed a two-view chest x-ray that shows persisting infiltrate in the left lower lung field, a little better than previous, no pneumothorax or abscess formation.  I do not appreciate any rib fractures.                    "

## 2024-02-12 ENCOUNTER — E-VISIT (OUTPATIENT)
Dept: FAMILY MEDICINE | Facility: CLINIC | Age: 44
End: 2024-02-12
Payer: COMMERCIAL

## 2024-02-12 DIAGNOSIS — J01.01 ACUTE RECURRENT MAXILLARY SINUSITIS: Primary | ICD-10-CM

## 2024-02-12 PROCEDURE — 99421 OL DIG E/M SVC 5-10 MIN: CPT | Performed by: FAMILY MEDICINE

## 2024-02-14 NOTE — PATIENT INSTRUCTIONS
Thank you for choosing us for your care. I have placed an order for a prescription so that you can start treatment. View your full visit summary for details by clicking on the link below. Your pharmacist will able to address any questions you may have about the medication.     If you're not feeling better within 5-7 days, please schedule an appointment.  You can schedule an appointment right here in [facility], or call [phone]  If the visit is for the same symptoms as your eVisit, we'll refund the cost of your eVisit if seen within seven days.

## 2024-03-05 SDOH — HEALTH STABILITY: PHYSICAL HEALTH: ON AVERAGE, HOW MANY MINUTES DO YOU ENGAGE IN EXERCISE AT THIS LEVEL?: 0 MIN

## 2024-03-05 SDOH — HEALTH STABILITY: PHYSICAL HEALTH: ON AVERAGE, HOW MANY DAYS PER WEEK DO YOU ENGAGE IN MODERATE TO STRENUOUS EXERCISE (LIKE A BRISK WALK)?: 0 DAYS

## 2024-03-05 ASSESSMENT — SOCIAL DETERMINANTS OF HEALTH (SDOH): HOW OFTEN DO YOU GET TOGETHER WITH FRIENDS OR RELATIVES?: ONCE A WEEK

## 2024-03-06 NOTE — COMMUNITY RESOURCES LIST (ENGLISH)
03/06/2024   [facility]  N/A  For questions about this resource list or additional care needs, please contact your primary care clinic or care manager.  Phone: [phone]   Email: N/A   Address: [address] [place] [id number]   Hours: N/A        Exercise and Recreation       Gym or workout facility  1  [employer] [facility] [address] [place]: 1.37 miles      In-Person   [address]  [place] [address]  Language: English  Hours: Mon - Sun Open 24 Hours  Fees: Insurance, Self Pay, Sliding Fee   Phone: [phone] Email: [email or web address] Website: [email or web address]     2  [facility] Distance: 5.79 miles      In-Person   [address] [place] [address]  Language: English  Hours: Mon - Fri 9:00 AM - 9:00 PM , Sat 9:00 AM - 7:00 PM  Fees: Free, Self Pay   Phone: (773) [phone] Email: [email or web address]. Website: [email or web address]          Important Numbers & Websites       Emergency Services   911  [facility]   311  Poison Control   (635) 992-6390  Suicide Prevention Lifeline   (785) 979-9605 (TALK)  Child Abuse Hotline   (523) 409-3248 (4-A-Child)  Sexual Assault Hotline   (949) 447-6129 (HOPE)  National Runaway Safeline   (955) 102-1869 (RUNAWAY)  All-Options Talkline   (615) 446-1243  Substance Abuse Referral   (631) 370-9192 (HELP)

## 2024-03-12 ENCOUNTER — OFFICE VISIT (OUTPATIENT)
Dept: FAMILY MEDICINE | Facility: CLINIC | Age: 44
End: 2024-03-12
Payer: COMMERCIAL

## 2024-03-12 VITALS
HEIGHT: 69 IN | RESPIRATION RATE: 14 BRPM | BODY MASS INDEX: 32.88 KG/M2 | HEART RATE: 60 BPM | OXYGEN SATURATION: 98 % | WEIGHT: 222 LBS | TEMPERATURE: 97.5 F | SYSTOLIC BLOOD PRESSURE: 120 MMHG | DIASTOLIC BLOOD PRESSURE: 72 MMHG

## 2024-03-12 DIAGNOSIS — J01.81 OTHER ACUTE RECURRENT SINUSITIS: ICD-10-CM

## 2024-03-12 DIAGNOSIS — E78.5 HYPERLIPIDEMIA LDL GOAL <100: ICD-10-CM

## 2024-03-12 DIAGNOSIS — R03.0 ELEVATED BLOOD PRESSURE READING WITHOUT DIAGNOSIS OF HYPERTENSION: ICD-10-CM

## 2024-03-12 DIAGNOSIS — Z00.00 ROUTINE PHYSICAL EXAMINATION: Primary | ICD-10-CM

## 2024-03-12 DIAGNOSIS — E66.811 CLASS 1 OBESITY WITH SERIOUS COMORBIDITY AND BODY MASS INDEX (BMI) OF 32.0 TO 32.9 IN ADULT, UNSPECIFIED OBESITY TYPE: ICD-10-CM

## 2024-03-12 DIAGNOSIS — Z01.84 IMMUNITY STATUS TESTING: ICD-10-CM

## 2024-03-12 LAB
ANION GAP SERPL CALCULATED.3IONS-SCNC: 9 MMOL/L (ref 7–15)
BUN SERPL-MCNC: 19.2 MG/DL (ref 6–20)
CALCIUM SERPL-MCNC: 9.6 MG/DL (ref 8.6–10)
CHLORIDE SERPL-SCNC: 105 MMOL/L (ref 98–107)
CHOLEST SERPL-MCNC: 213 MG/DL
CREAT SERPL-MCNC: 1.22 MG/DL (ref 0.67–1.17)
DEPRECATED HCO3 PLAS-SCNC: 26 MMOL/L (ref 22–29)
EGFRCR SERPLBLD CKD-EPI 2021: 75 ML/MIN/1.73M2
FASTING STATUS PATIENT QL REPORTED: YES
GLUCOSE SERPL-MCNC: 101 MG/DL (ref 70–99)
HBV SURFACE AB SERPL IA-ACNC: 4.32 M[IU]/ML
HBV SURFACE AB SERPL IA-ACNC: NONREACTIVE M[IU]/ML
HDLC SERPL-MCNC: 42 MG/DL
HOLD SPECIMEN: NORMAL
LDLC SERPL CALC-MCNC: 149 MG/DL
NONHDLC SERPL-MCNC: 171 MG/DL
POTASSIUM SERPL-SCNC: 4.6 MMOL/L (ref 3.4–5.3)
SODIUM SERPL-SCNC: 140 MMOL/L (ref 135–145)
TRIGL SERPL-MCNC: 112 MG/DL

## 2024-03-12 PROCEDURE — 99396 PREV VISIT EST AGE 40-64: CPT | Performed by: FAMILY MEDICINE

## 2024-03-12 PROCEDURE — 80061 LIPID PANEL: CPT | Performed by: FAMILY MEDICINE

## 2024-03-12 PROCEDURE — 80048 BASIC METABOLIC PNL TOTAL CA: CPT | Performed by: FAMILY MEDICINE

## 2024-03-12 PROCEDURE — 86706 HEP B SURFACE ANTIBODY: CPT | Performed by: FAMILY MEDICINE

## 2024-03-12 PROCEDURE — 99213 OFFICE O/P EST LOW 20 MIN: CPT | Mod: 25 | Performed by: FAMILY MEDICINE

## 2024-03-12 PROCEDURE — 36415 COLL VENOUS BLD VENIPUNCTURE: CPT | Performed by: FAMILY MEDICINE

## 2024-03-12 RX ORDER — DOXYCYCLINE HYCLATE 100 MG
100 TABLET ORAL 2 TIMES DAILY
Qty: 20 TABLET | Refills: 0 | Status: SHIPPED | OUTPATIENT
Start: 2024-03-12 | End: 2024-03-22

## 2024-03-12 ASSESSMENT — PAIN SCALES - GENERAL: PAINLEVEL: NO PAIN (0)

## 2024-03-12 NOTE — PROGRESS NOTES
Assessment/Plan:     Routine physical examination  Routine healthcare maintenance.  Preventative cares reviewed.  Annual physical exams to continue.  Anticipate screening colonoscopy at age 44.  - Extra Tube  - Extra Tube    Class 1 obesity with serious comorbidity and body mass index (BMI) of 32.0 to 32.9 in adult, unspecified obesity type  Dietary and exercise modifications for weight goal less than 210 pounds initially, less than 200 pounds ideally.  1 pound weight gain since physical March 10, 2023 noted.  - Extra Tube  - Extra Tube    Hyperlipidemia LDL goal <100  Hyperlipidemia.  Prior 10-year cardiovascular risk of 2.8% noted.  Dietary and exercise modifications as above for weight goal less than 210 pounds initially, less than 200 pounds ideally.  - Lipid panel reflex to direct LDL Fasting  - Lipid panel reflex to direct LDL Fasting  - Extra Tube  - Extra Tube    Other acute recurrent sinusitis  Nasal infection right nares region.  Doxycycline 100 mg twice daily x 10 days for MRSA coverage.  ENT referral due to recurrent issues if ongoing after completion of antibiotic or recurrent concerns.  - doxycycline hyclate (VIBRA-TABS) 100 MG tablet  Dispense: 20 tablet; Refill: 0  - Adult ENT  Referral  - Extra Tube  - Extra Tube    Immunity status testing  Immunity status testing with hepatitis B surface antibody.  - Hepatitis B Surface Antibody  - Hepatitis B Surface Antibody  - Extra Tube  - Extra Tube    Elevated blood pressure reading without diagnosis of hypertension  History of blood pressure elevation historically without current concern.  Continue dietary and exercise modification in order to reduce future risk for hypertension.  - Basic metabolic panel  - Basic metabolic panel  - Extra Tube  - Extra Tube       I have had an Advance Directives discussion with the patient.  The following high BMI interventions were performed this visit: encouragement to exercise, weight monitoring, weight loss  from baseline weight, and lifestyle education regarding diet    The 10-year ASCVD risk score (Jennifer PICHARDO, et al., 2019) is: 2.8%    Values used to calculate the score:      Age: 43 years      Sex: Male      Is Non- : No      Diabetic: No      Tobacco smoker: No      Systolic Blood Pressure: 120 mmHg      Is BP treated: No      HDL Cholesterol: 44 mg/dL      Total Cholesterol: 247 mg/dL s      Subjective:     Chema Be is a 43 year old male who presents for an annual exam.  In general doing well.  Not getting consistent exercise.  Some dietary discretions.  History of hyperlipidemia with prior 10-year cardiovascular risk of 2.8% noted.  Patient has had recurrent sinus issues more right nasal more at nares more so than postnasal drainage etc. however does feel that it includes his sinuses at times.  Has not seen ENT in the past.  No cough.  No fever.  Blood pressure elevation in the past, mild without ongoing issues.  No headache.  Comprehensive review of systems as above otherwise all negative.      Single  No children  Tobacco:  none  EtOH:  occ  Mom - unhealthy; h/o ulcerative colitis s/p colectomy with pouch -> now with colestomy bag; fibromyalgia  Dad -  2 bros -  1 sis -  Surgeries:   none  Hospitalizations:  none  Work:    Hobbies:  golf, travel, eat and drink         Healthy Habits:   HPI     Immunization History   Administered Date(s) Administered    COVID-19 Monovalent 12+ (Pfizer 2022) 01/14/2022    COVID-19 Vaccine (Ray) 04/07/2021    TD,PF 7+ (Tenivac) 10/20/2009    TDAP (Adacel,Boostrix) 02/27/2020    Td (Adult), Adsorbed 10/20/2009     Immunization status:  UTD    Current Outpatient Medications   Medication Sig Dispense Refill    doxycycline hyclate (VIBRA-TABS) 100 MG tablet Take 1 tablet (100 mg) by mouth 2 times daily for 10 days 20 tablet 0     Past Medical History:   Diagnosis Date    Anxiety     Anxiety     Depression     Depressive disorder      Elevated blood pressure reading     HLD (hyperlipidemia)      No past surgical history on file.  Patient has no known allergies.  Family History   Problem Relation Age of Onset    Heart Disease Paternal Uncle     Diabetes Maternal Grandfather     Alcoholism Maternal Grandfather     Depression Maternal Grandfather      Social History     Socioeconomic History    Marital status: Single     Spouse name: Not on file    Number of children: Not on file    Years of education: Not on file    Highest education level: Not on file   Occupational History    Not on file   Tobacco Use    Smoking status: Never    Smokeless tobacco: Never   Substance and Sexual Activity    Alcohol use: Yes     Alcohol/week: 1.0 standard drink of alcohol    Drug use: Never    Sexual activity: Not Currently     Partners: Female   Other Topics Concern    Not on file   Social History Narrative    Not on file     Social Determinants of Health     Financial Resource Strain: Low Risk  (3/5/2024)    Financial Resource Strain     Within the past 12 months, have you or your family members you live with been unable to get utilities (heat, electricity) when it was really needed?: No   Food Insecurity: Low Risk  (3/5/2024)    Food Insecurity     Within the past 12 months, did you worry that your food would run out before you got money to buy more?: No     Within the past 12 months, did the food you bought just not last and you didn t have money to get more?: No   Transportation Needs: Low Risk  (3/5/2024)    Transportation Needs     Within the past 12 months, has lack of transportation kept you from medical appointments, getting your medicines, non-medical meetings or appointments, work, or from getting things that you need?: No   Physical Activity: Inactive (3/5/2024)    Exercise Vital Sign     Days of Exercise per Week: 0 days     Minutes of Exercise per Session: 0 min   Stress: Stress Concern Present (3/5/2024)    [employer] -  "Occupational Stress Questionnaire     Feeling of Stress : To some extent   Social Connections: Unknown (3/5/2024)    Social Connection and Isolation Panel [NHANES]     Frequency of Communication with Friends and Family: Not on file     Frequency of Social Gatherings with Friends and Family: Once a week     Attends Gnosticist Services: Not on file     Active Member of Clubs or Organizations: Not on file     Attends Club or Organization Meetings: Not on file     Marital Status: Not on file   Interpersonal Safety: Low Risk  (3/12/2024)    Interpersonal Safety     Do you feel physically and emotionally safe where you currently live?: Yes     Within the past 12 months, have you been hit, slapped, kicked or otherwise physically hurt by someone?: No     Within the past 12 months, have you been humiliated or emotionally abused in other ways by your partner or ex-partner?: No   Housing Stability: Low Risk  (3/5/2024)    Housing Stability     Do you have housing? : Yes     Are you worried about losing your housing?: No       Review of Systems  Comprehensive ROS: as above, otherwise all negative.           Objective:     /72   Pulse 60   Temp 97.5  F (36.4  C)   Resp 14   Ht 1.753 m (5' 9\")   Wt 100.7 kg (222 lb)   SpO2 98%   BMI 32.78 kg/m    Body mass index is 32.78 kg/m .    Physical    General Appearance:    Alert, cooperative, no distress, appears stated age.  BMI 32.78.   Head:    Normocephalic, without obvious abnormality, atraumatic   Eyes:    PERRL, conjunctiva/corneas clear, EOM's intact, fundi     benign, both eyes        Ears:    Normal TM's and external ear canals, both ears   Nose:   Right nares inflamed, otherwise septum midline, mucosa normal, no drainage \or sinus tenderness.     Throat:   Lips, mucosa, and tongue normal; teeth and gums normal   Neck:   Supple, symmetrical, trachea midline, no adenopathy;        thyroid:  No enlargement/tenderness/nodules; no carotid    bruit or JVD   Back:     " Symmetric, no curvature, ROM normal, no CVA tenderness   Lungs:     Clear to auscultation bilaterally, respirations unlabored   Chest wall:    No tenderness or deformity   Heart:    Regular rate and rhythm, S1 and S2 normal, no murmur, rub   or gallop   Abdomen:     Soft, non-tender, bowel sounds active all four quadrants,     no masses, no organomegaly.     Genitalia:    deferred   Rectal:    deferred   Extremities:   Extremities normal, atraumatic, no cyanosis or edema   Pulses:   2+ and symmetric all extremities   Skin:   Skin color, texture, turgor normal, no rashes or lesions   Lymph nodes:   Cervical, supraclavicular, and axillary nodes normal   Neurologic:   CNII-XII intact. Normal strength, sensation and reflexes       throughout                This note has been dictated using voice recognition software and as a result may contain minor grammatical errors and unintended word substitutions.

## 2025-01-16 NOTE — PROGRESS NOTES
Assessment/Plan:     Routine physical examination  Routine healthcare maintenance.  Preventative cares reviewed.  Annual physical exams to continue.    Class 1 obesity due to excess calories without serious comorbidity with body mass index (BMI) of 32.0 to 32.9 in adult  Dietary and exercise modifications for weight goal less than 210 pounds initially, less than 200 pounds ideally.    Elevated blood pressure reading without diagnosis of hypertension  Elevated blood pressure historically without history of hypertension.  Blood pressure on recheck today 126/84 and will continue to monitor outside of office for goal less than 120/80.  Lab assessment completed today  - Basic metabolic panel  - TSH  - CBC with platelets  - Basic metabolic panel  - TSH  - CBC with platelets    Hyperlipidemia LDL goal <100  History of mild hyperlipidemia.  Diet controlled.  Weight goal less than 210 pounds initially, less than 200 pounds ideally.  Noted xanthelasmas on exam.  - Lipid panel reflex to direct LDL Fasting  - Lipid panel reflex to direct LDL Fasting    Xanthelasma  As above, update lipid cascade.  Dietary and exercise modifications reviewed.          Subjective:     Chema Be is a 42 year old male who presents for an annual exam.  Establishing care.  In general doing well.  Has had mild blood pressure elevation historically without history of hypertension.  Hyperlipidemia, diet controlled.  Has gained some weight.  Less active.  Has noticed skin lesions medial aspect upper eyelids consistent with xanthelasma.  Family history reviewed mother with ulcerative colitis status post colectomy with prior J-pouch subsequently revision requiring colostomy bag.  Mother also has fibromyalgia.  Family history appears unremarkable for significant hypertension.  Comprehensive review of systems as above otherwise all negative.      Single  No children  Tobacco:  none  EtOH:  occ  Mom - unhealthy; h/o ulcerative colitis s/p colectomy  mucosa moist , no lesions with pouch -> now with colestomy bag; fibromyalgia  Dad -   2 bros -   1 sis -   Surgeries:   none  Hospitalizations:  none   Work:    Hobbies:  golf, travel, eat and drink      Answers for HPI/ROS submitted by the patient on 3/3/2023  abdominal pain: No  Blood in stool: No  Blood in urine: No  chest pain: No  chills: No  congestion: Yes  constipation: No  cough: No  diarrhea: No  dizziness: No  ear pain: Yes  eye pain: No  nervous/anxious: Yes  fever: No  frequency: No  genital sores: No  headaches: No  hearing loss: Yes  heartburn: No  arthralgias: No  joint swelling: No  peripheral edema: No  mood changes: No  myalgias: No  nausea: No  dysuria: No  palpitations: No  Skin sensation changes: No  sore throat: Yes  urgency: No  rash: No  shortness of breath: No  visual disturbance: No  weakness: No  impotence: No  penile discharge: No          Healthy Habits:     Getting at least 3 servings of Calcium per day:  NO    Bi-annual eye exam:  Yes    Dental care twice a year:  Yes    Sleep apnea or symptoms of sleep apnea:  None    Diet:  Regular (no restrictions) and Breakfast skipped    Frequency of exercise:  None    Taking medications regularly:  Yes    Medication side effects:  Not applicable    PHQ-2 Total Score: 1    Additional concerns today:  Yes       Immunization History   Administered Date(s) Administered     COVID-19 Vaccine (Tecogen) 04/07/2021     COVID-19 Vaccine 12+ (Pfizer 2022) 01/14/2022     Td (Adult), Adsorbed 10/20/2009     Tdap (Adacel,Boostrix) 02/27/2020     Immunization status:  UTD    No current outpatient medications on file.     Past Medical History:   Diagnosis Date     Anxiety      Anxiety      Depression      Depressive disorder      Elevated blood pressure reading      HLD (hyperlipidemia)      No past surgical history on file.  Patient has no known allergies.  Family History   Problem Relation Age of Onset     Heart Disease Paternal Uncle      Diabetes Maternal Grandfather   "    Alcoholism Maternal Grandfather      Depression Maternal Grandfather      Social History     Socioeconomic History     Marital status: Single     Spouse name: Not on file     Number of children: Not on file     Years of education: Not on file     Highest education level: Not on file   Occupational History     Not on file   Tobacco Use     Smoking status: Never     Smokeless tobacco: Never   Substance and Sexual Activity     Alcohol use: Yes     Alcohol/week: 1.0 standard drink     Drug use: Never     Sexual activity: Not Currently     Partners: Female   Other Topics Concern     Not on file   Social History Narrative     Not on file     Social Determinants of Health     Financial Resource Strain: Not on file   Food Insecurity: Not on file   Transportation Needs: Not on file   Physical Activity: Not on file   Stress: Not on file   Social Connections: Not on file   Intimate Partner Violence: Not on file   Housing Stability: Not on file       Review of Systems  Comprehensive ROS: as above, otherwise all negative.           Objective:     /84   Pulse 77   Temp 98.3  F (36.8  C) (Oral)   Resp 14   Ht 1.753 m (5' 9\")   Wt 100.5 kg (221 lb 8 oz)   SpO2 99%   BMI 32.71 kg/m    Body mass index is 32.71 kg/m .    Physical    General Appearance:    Alert, cooperative, no distress, appears stated age.  BMI = 32.71   Head:    Normocephalic, without obvious abnormality, atraumatic   Eyes:    PERRL, conjunctiva/corneas clear, EOM's intact, fundi     benign, both eyes        Ears:    Normal TM's and external ear canals, both ears   Nose:   Nares normal, septum midline, mucosa normal, no drainage    or sinus tenderness   Throat:   Lips, mucosa, and tongue normal; teeth and gums normal   Neck:   Supple, symmetrical, trachea midline, no adenopathy;        thyroid:  No enlargement/tenderness/nodules; no carotid    bruit or JVD   Back:     Symmetric, no curvature, ROM normal, no CVA tenderness   Lungs:     Clear to " auscultation bilaterally, respirations unlabored   Chest wall:    No tenderness or deformity   Heart:    Regular rate and rhythm, S1 and S2 normal, no murmur, rub   or gallop   Abdomen:     Soft, non-tender, bowel sounds active all four quadrants,     no masses, no organomegaly.     Genitalia:    Normal male without lesion, discharge or tenderness.  No inguinal hernia noted.     Rectal:    deferred   Extremities:   Extremities normal, atraumatic, no cyanosis or edema   Pulses:   2+ and symmetric all extremities   Skin:   Skin color, texture, turgor normal, no rashes.  Medial upper eyelid xanthelasmas present.   Lymph nodes:   Cervical, supraclavicular, and axillary nodes normal   Neurologic:   CNII-XII intact. Normal strength, sensation and reflexes       throughout                This note has been dictated using voice recognition software and as a result may contain minor grammatical errors and unintended word substitutions.

## 2025-03-17 SDOH — HEALTH STABILITY: PHYSICAL HEALTH: ON AVERAGE, HOW MANY DAYS PER WEEK DO YOU ENGAGE IN MODERATE TO STRENUOUS EXERCISE (LIKE A BRISK WALK)?: 1 DAY

## 2025-03-17 SDOH — HEALTH STABILITY: PHYSICAL HEALTH: ON AVERAGE, HOW MANY MINUTES DO YOU ENGAGE IN EXERCISE AT THIS LEVEL?: 10 MIN

## 2025-03-17 ASSESSMENT — SOCIAL DETERMINANTS OF HEALTH (SDOH): HOW OFTEN DO YOU GET TOGETHER WITH FRIENDS OR RELATIVES?: TWICE A WEEK

## 2025-03-18 ENCOUNTER — OFFICE VISIT (OUTPATIENT)
Dept: FAMILY MEDICINE | Facility: CLINIC | Age: 45
End: 2025-03-18
Attending: FAMILY MEDICINE
Payer: COMMERCIAL

## 2025-03-18 VITALS
BODY MASS INDEX: 33.18 KG/M2 | HEART RATE: 67 BPM | SYSTOLIC BLOOD PRESSURE: 120 MMHG | DIASTOLIC BLOOD PRESSURE: 80 MMHG | OXYGEN SATURATION: 98 % | RESPIRATION RATE: 16 BRPM | WEIGHT: 224 LBS | TEMPERATURE: 98.2 F | HEIGHT: 69 IN

## 2025-03-18 DIAGNOSIS — E66.811 CLASS 1 OBESITY WITHOUT SERIOUS COMORBIDITY WITH BODY MASS INDEX (BMI) OF 33.0 TO 33.9 IN ADULT, UNSPECIFIED OBESITY TYPE: ICD-10-CM

## 2025-03-18 DIAGNOSIS — E78.5 HYPERLIPIDEMIA LDL GOAL <100: ICD-10-CM

## 2025-03-18 DIAGNOSIS — Z00.00 ROUTINE PHYSICAL EXAMINATION: Primary | ICD-10-CM

## 2025-03-18 DIAGNOSIS — R73.01 IMPAIRED FASTING GLUCOSE: ICD-10-CM

## 2025-03-18 DIAGNOSIS — Z12.11 SCREEN FOR COLON CANCER: ICD-10-CM

## 2025-03-18 LAB
EST. AVERAGE GLUCOSE BLD GHB EST-MCNC: 100 MG/DL
HBA1C MFR BLD: 5.1 % (ref 0–5.6)

## 2025-03-18 PROCEDURE — 36415 COLL VENOUS BLD VENIPUNCTURE: CPT | Performed by: FAMILY MEDICINE

## 2025-03-18 PROCEDURE — 99396 PREV VISIT EST AGE 40-64: CPT | Performed by: FAMILY MEDICINE

## 2025-03-18 PROCEDURE — 80061 LIPID PANEL: CPT | Performed by: FAMILY MEDICINE

## 2025-03-18 PROCEDURE — 80048 BASIC METABOLIC PNL TOTAL CA: CPT | Performed by: FAMILY MEDICINE

## 2025-03-18 PROCEDURE — 83036 HEMOGLOBIN GLYCOSYLATED A1C: CPT | Performed by: FAMILY MEDICINE

## 2025-03-18 ASSESSMENT — PAIN SCALES - GENERAL: PAINLEVEL_OUTOF10: NO PAIN (0)

## 2025-03-18 NOTE — PROGRESS NOTES
"Preventive Care Visit  [facility]  Robbie Partida MD, Family Medicine  Mar 18, 2025      Assessment & Plan     Routine physical examination  Routine healthcare maintenance.  Preventative cares reviewed.  Annual physical exams to continue.    Class 1 obesity without serious comorbidity with body mass index (BMI) of 33.0 to 33.9 in adult, unspecified obesity type  Dietary and exercise modifications reviewed.  Weight goal less than 210 pounds initially, less than 200 pounds ideally.    Hyperlipidemia LDL goal <100  History of mild hyperlipidemia.  Prior 10-year cardiovascular risk at 2.8%.  Update lipid cascade.  Weight goal less than 210 pounds initially, less than 200 pounds ideally.  - Lipid panel reflex to direct LDL Fasting  - Lipid panel reflex to direct LDL Fasting    Impaired fasting glucose  A1c and fasting glucose obtained.  Therapeutic lifestyle changes reviewed as noted above.  - Hemoglobin A1c  - Basic metabolic panel  - Hemoglobin A1c  - Basic metabolic panel    Screen for colon cancer  Colonoscopy to be completed for colon cancer screening based on age criteria.  - Colonoscopy Screening  Referral      Patient has been advised of split billing requirements and indicates understanding: Yes        BMI  Estimated body mass index is 33.08 kg/m  as calculated from the following:    Height as of this encounter: 1.753 m (5' 9\").    Weight as of this encounter: 101.6 kg (224 lb).   Weight management plan: Discussed healthy diet and exercise guidelines    Counseling  Appropriate preventive services were addressed with this patient via screening, questionnaire, or discussion as appropriate for fall prevention, nutrition, physical activity, Tobacco-use cessation, social engagement, weight loss and cognition.  Checklist reviewing preventive services available has been given to the patient.  Reviewed patient's diet, addressing concerns and/or questions.   He is at risk for lack of " exercise and has been provided with information to increase physical activity for the benefit of his well-being.   He is at risk for psychosocial distress and has been provided with information to reduce risk.           Sahil Mar is a 44 year old, presenting for the following:  Physical (Pt is fasting)        3/18/2025     7:00 AM   Additional Questions   Roomed by [facility]    Patient seen today for physical exam.  In general doing well.  History of mild hyperlipidemia.  Also has had fasting glucose of 101.  2 pound weight gain since March 12, 2024.  Denies health concerns.  Ears feel wet at times like they are draining worse in the morning typically better throughout the day.  No postnasal drainage.  Has had ear tubes in the past.  Comprehensive review of systems as above otherwise all negative.  Has not had colonoscopy previously.  Denies risk for hepatitis B.  Declines immunizations for influenza or COVID vaccination.       Single   No children   Tobacco:  none   EtOH:  occ   Mom - unhealthy; h/o ulcerative colitis s/p colectomy with pouch -> now with colestomy bag; fibromyalgia   Dad -   2 bros -   1 sis -   Surgeries:   none   Hospitalizations:  none   Work:     Hobbies:  golf, travel, eat and drink               Advance Care Planning  Patient does not have a Health Care Directive: Discussed advance care planning with patient; information given to patient to review.      3/17/2025   General Health   How would you rate your overall physical health? Good   Feel stress (tense, anxious, or unable to sleep) To some extent   (!) STRESS CONCERN      3/17/2025   Nutrition   Three or more servings of calcium each day? (!) NO   Diet: Regular (no restrictions)   How many servings of fruit and vegetables per day? (!) 0-1   How many sweetened beverages each day? 0-1         3/17/2025   Exercise   Days per week of moderate/strenous exercise 1 day   Average minutes spent exercising at this level 10  min   (!) EXERCISE CONCERN      3/17/2025   Social Factors   Frequency of gathering with friends or relatives Twice a week   Worry food won't last until get money to buy more No   Food not last or not have enough money for food? No   Do you have housing? (Housing is defined as stable permanent housing and does not include staying ouside in a car, in a tent, in an abandoned building, in an overnight shelter, or couch-surfing.) Yes   Are you worried about losing your housing? No   Lack of transportation? No   Unable to get utilities (heat,electricity)? No         3/17/2025   Dental   Dentist two times every year? Yes           3/5/2024   TB Screening   Were you born outside of the [place]? No           Today's PHQ-2 Score:       3/17/2025    11:41 AM   PHQ-2 ( 1999 Pfizer)   Q1: Little interest or pleasure in doing things 0   Q2: Feeling down, depressed or hopeless 0   PHQ-2 Score 0    Q1: Little interest or pleasure in doing things Not at all   Q2: Feeling down, depressed or hopeless Not at all   PHQ-2 Score 0       Patient-reported           3/17/2025   Substance Use   Alcohol more than 3/day or more than 7/wk No   Do you use any other substances recreationally? No     Social History     Tobacco Use    Smoking status: Never    Smokeless tobacco: Never   Substance Use Topics    Alcohol use: Yes     Alcohol/week: 1.0 standard drink of alcohol    Drug use: Never           3/17/2025   STI Screening   New sexual partner(s) since last STI/HIV test? No   ASCVD Risk   The 10-year ASCVD risk score (Jennifer PICHARDO, et al., 2019) is: 2.5%    Values used to calculate the score:      Age: 44 years      Sex: Male      Is Non- : No      Diabetic: No      Tobacco smoker: No      Systolic Blood Pressure: 120 mmHg      Is BP treated: No      HDL Cholesterol: 42 mg/dL      Total Cholesterol: 213 mg/dL        3/17/2025   Contraception/Family Planning   Questions about contraception or family planning No       "  Reviewed and updated as needed this visit by Provider                    Past Medical History:   Diagnosis Date    Anxiety     Anxiety     Depression     Depressive disorder     Elevated blood pressure reading     HLD (hyperlipidemia)      No past surgical history on file.  Lab work is in process  Labs reviewed in EPIC  BP Readings from Last 3 Encounters:   03/18/25 120/80   03/12/24 120/72   04/27/23 124/84    Wt Readings from Last 3 Encounters:   03/18/25 101.6 kg (224 lb)   03/12/24 100.7 kg (222 lb)   04/27/23 101.2 kg (223 lb 3.2 oz)                  Patient Active Problem List   Diagnosis    Elevated blood pressure reading without diagnosis of hypertension     No past surgical history on file.    Social History     Tobacco Use    Smoking status: Never    Smokeless tobacco: Never   Substance Use Topics    Alcohol use: Yes     Alcohol/week: 1.0 standard drink of alcohol     Family History   Problem Relation Age of Onset    Heart Disease Paternal Uncle     Diabetes Maternal Grandfather     Alcoholism Maternal Grandfather     Depression Maternal Grandfather          No current outpatient medications on file.     No Known Allergies      Review of Systems  Constitutional, HEENT, cardiovascular, pulmonary, GI, , musculoskeletal, neuro, skin, endocrine and psych systems are negative, except as otherwise noted.     Objective    Exam  /80   Pulse 67   Temp 98.2  F (36.8  C)   Resp 16   Ht 1.753 m (5' 9\")   Wt 101.6 kg (224 lb)   SpO2 98%   BMI 33.08 kg/m     Estimated body mass index is 33.08 kg/m  as calculated from the following:    Height as of this encounter: 1.753 m (5' 9\").    Weight as of this encounter: 101.6 kg (224 lb).    Physical Exam  GENERAL: alert and no distress  EYES: Eyes grossly normal to inspection, PERRL and conjunctivae and sclerae normal  HENT: ear canals and TM's normal, nose and mouth without ulcers or lesions  NECK: no adenopathy, no asymmetry, masses, or scars  RESP: lungs " clear to auscultation - no rales, rhonchi or wheezes  CV: regular rate and rhythm, normal S1 S2, no S3 or S4, no murmur, click or rub, no peripheral edema  ABDOMEN: soft, nontender, no hepatosplenomegaly, no masses and bowel sounds normal   (male):  deferred per patient  MS: no gross musculoskeletal defects noted, no edema  SKIN: no suspicious lesions or rashes.  Left posterior chest cystic lesion consistent with sebaceous cyst, noninflamed.  NEURO: Normal strength and tone, mentation intact and speech normal  PSYCH: mentation appears normal, affect normal/bright        Signed Electronically by: Robbie Partida MD

## 2025-03-19 LAB
ANION GAP SERPL CALCULATED.3IONS-SCNC: 11 MMOL/L (ref 7–15)
BUN SERPL-MCNC: 20.9 MG/DL (ref 6–20)
CALCIUM SERPL-MCNC: 9.7 MG/DL (ref 8.8–10.4)
CHLORIDE SERPL-SCNC: 104 MMOL/L (ref 98–107)
CHOLEST SERPL-MCNC: 207 MG/DL
CREAT SERPL-MCNC: 1.14 MG/DL (ref 0.67–1.17)
EGFRCR SERPLBLD CKD-EPI 2021: 81 ML/MIN/1.73M2
FASTING STATUS PATIENT QL REPORTED: YES
FASTING STATUS PATIENT QL REPORTED: YES
GLUCOSE SERPL-MCNC: 98 MG/DL (ref 70–99)
HCO3 SERPL-SCNC: 25 MMOL/L (ref 22–29)
HDLC SERPL-MCNC: 46 MG/DL
LDLC SERPL CALC-MCNC: 140 MG/DL
NONHDLC SERPL-MCNC: 161 MG/DL
POTASSIUM SERPL-SCNC: 4.6 MMOL/L (ref 3.4–5.3)
SODIUM SERPL-SCNC: 140 MMOL/L (ref 135–145)
TRIGL SERPL-MCNC: 107 MG/DL

## 2025-06-06 PROBLEM — D12.6 ADENOMATOUS POLYP OF COLON: Status: ACTIVE | Noted: 2025-06-06

## 2025-06-09 ENCOUNTER — PATIENT OUTREACH (OUTPATIENT)
Dept: GASTROENTEROLOGY | Facility: CLINIC | Age: 45
End: 2025-06-09
Payer: COMMERCIAL

## 2025-08-20 ENCOUNTER — NURSE TRIAGE (OUTPATIENT)
Dept: FAMILY MEDICINE | Facility: CLINIC | Age: 45
End: 2025-08-20
Payer: COMMERCIAL